# Patient Record
Sex: MALE | Race: ASIAN | NOT HISPANIC OR LATINO
[De-identification: names, ages, dates, MRNs, and addresses within clinical notes are randomized per-mention and may not be internally consistent; named-entity substitution may affect disease eponyms.]

---

## 2019-08-06 PROBLEM — Z00.00 ENCOUNTER FOR PREVENTIVE HEALTH EXAMINATION: Status: ACTIVE | Noted: 2019-08-06

## 2019-08-19 PROBLEM — Z72.89 ALCOHOL USE: Status: ACTIVE | Noted: 2019-08-19

## 2019-08-19 PROBLEM — K62.5 RECTAL BLEEDING: Status: ACTIVE | Noted: 2019-08-19

## 2019-08-19 PROBLEM — Z86.39 HISTORY OF HYPERCHOLESTEROLEMIA: Status: RESOLVED | Noted: 2019-08-19 | Resolved: 2019-08-19

## 2019-08-19 RX ORDER — ROSUVASTATIN CALCIUM 5 MG/1
TABLET, FILM COATED ORAL
Refills: 0 | Status: ACTIVE | COMMUNITY

## 2019-08-26 ENCOUNTER — APPOINTMENT (OUTPATIENT)
Dept: COLORECTAL SURGERY | Facility: CLINIC | Age: 64
End: 2019-08-26
Payer: COMMERCIAL

## 2019-08-26 DIAGNOSIS — K64.9 UNSPECIFIED HEMORRHOIDS: ICD-10-CM

## 2019-08-26 DIAGNOSIS — K62.5 HEMORRHAGE OF ANUS AND RECTUM: ICD-10-CM

## 2019-08-26 DIAGNOSIS — Z86.39 PERSONAL HISTORY OF OTHER ENDOCRINE, NUTRITIONAL AND METABOLIC DISEASE: ICD-10-CM

## 2019-08-26 DIAGNOSIS — Z72.89 OTHER PROBLEMS RELATED TO LIFESTYLE: ICD-10-CM

## 2019-08-26 PROCEDURE — 45378 DIAGNOSTIC COLONOSCOPY: CPT

## 2019-08-26 PROCEDURE — 99201 OFFICE OUTPATIENT NEW 10 MINUTES: CPT | Mod: 25

## 2019-08-26 RX ORDER — SODIUM PICOSULFATE, MAGNESIUM OXIDE, AND ANHYDROUS CITRIC ACID 10; 3.5; 12 MG/160ML; G/160ML; G/160ML
10-3.5-12 MG-GM LIQUID ORAL
Qty: 2 | Refills: 0 | Status: DISCONTINUED | COMMUNITY
Start: 2019-08-19 | End: 2019-08-26

## 2020-02-06 ENCOUNTER — APPOINTMENT (OUTPATIENT)
Dept: OPHTHALMOLOGY | Facility: CLINIC | Age: 65
End: 2020-02-06
Payer: COMMERCIAL

## 2020-02-06 ENCOUNTER — NON-APPOINTMENT (OUTPATIENT)
Age: 65
End: 2020-02-06

## 2020-02-06 PROCEDURE — 92201 OPSCPY EXTND RTA DRAW UNI/BI: CPT

## 2020-02-06 PROCEDURE — 92134 CPTRZ OPH DX IMG PST SGM RTA: CPT

## 2020-02-06 PROCEDURE — 92004 COMPRE OPH EXAM NEW PT 1/>: CPT

## 2020-02-20 ENCOUNTER — NON-APPOINTMENT (OUTPATIENT)
Age: 65
End: 2020-02-20

## 2020-02-20 ENCOUNTER — APPOINTMENT (OUTPATIENT)
Dept: OPHTHALMOLOGY | Facility: CLINIC | Age: 65
End: 2020-02-20
Payer: COMMERCIAL

## 2020-02-20 PROCEDURE — 92201 OPSCPY EXTND RTA DRAW UNI/BI: CPT

## 2020-02-20 PROCEDURE — 92012 INTRM OPH EXAM EST PATIENT: CPT

## 2020-03-19 ENCOUNTER — APPOINTMENT (OUTPATIENT)
Dept: OPHTHALMOLOGY | Facility: CLINIC | Age: 65
End: 2020-03-19

## 2021-09-29 ENCOUNTER — APPOINTMENT (OUTPATIENT)
Dept: ULTRASOUND IMAGING | Facility: CLINIC | Age: 66
End: 2021-09-29
Payer: COMMERCIAL

## 2021-09-29 ENCOUNTER — OUTPATIENT (OUTPATIENT)
Dept: OUTPATIENT SERVICES | Facility: HOSPITAL | Age: 66
LOS: 1 days | End: 2021-09-29
Payer: COMMERCIAL

## 2021-09-29 DIAGNOSIS — Z00.8 ENCOUNTER FOR OTHER GENERAL EXAMINATION: ICD-10-CM

## 2021-09-29 PROCEDURE — 76775 US EXAM ABDO BACK WALL LIM: CPT

## 2021-09-29 PROCEDURE — 76775 US EXAM ABDO BACK WALL LIM: CPT | Mod: 26

## 2022-01-31 ENCOUNTER — APPOINTMENT (OUTPATIENT)
Dept: RADIOLOGY | Facility: CLINIC | Age: 67
End: 2022-01-31
Payer: COMMERCIAL

## 2022-01-31 ENCOUNTER — OUTPATIENT (OUTPATIENT)
Dept: OUTPATIENT SERVICES | Facility: HOSPITAL | Age: 67
LOS: 1 days | End: 2022-01-31
Payer: COMMERCIAL

## 2022-01-31 DIAGNOSIS — Z00.00 ENCOUNTER FOR GENERAL ADULT MEDICAL EXAMINATION WITHOUT ABNORMAL FINDINGS: ICD-10-CM

## 2022-01-31 PROCEDURE — 73080 X-RAY EXAM OF ELBOW: CPT

## 2022-01-31 PROCEDURE — 73080 X-RAY EXAM OF ELBOW: CPT | Mod: 26,LT

## 2024-02-19 ENCOUNTER — APPOINTMENT (OUTPATIENT)
Dept: MRI IMAGING | Facility: CLINIC | Age: 69
End: 2024-02-19

## 2024-02-19 ENCOUNTER — OUTPATIENT (OUTPATIENT)
Dept: OUTPATIENT SERVICES | Facility: HOSPITAL | Age: 69
LOS: 1 days | End: 2024-02-19
Payer: COMMERCIAL

## 2024-02-19 ENCOUNTER — APPOINTMENT (OUTPATIENT)
Dept: RADIOLOGY | Facility: CLINIC | Age: 69
End: 2024-02-19
Payer: COMMERCIAL

## 2024-02-19 ENCOUNTER — APPOINTMENT (OUTPATIENT)
Dept: ULTRASOUND IMAGING | Facility: CLINIC | Age: 69
End: 2024-02-19
Payer: COMMERCIAL

## 2024-02-19 DIAGNOSIS — Z00.8 ENCOUNTER FOR OTHER GENERAL EXAMINATION: ICD-10-CM

## 2024-02-19 PROCEDURE — 72141 MRI NECK SPINE W/O DYE: CPT

## 2024-02-19 PROCEDURE — 73000 X-RAY EXAM OF COLLAR BONE: CPT

## 2024-02-19 PROCEDURE — 76536 US EXAM OF HEAD AND NECK: CPT | Mod: 26

## 2024-02-19 PROCEDURE — 72040 X-RAY EXAM NECK SPINE 2-3 VW: CPT | Mod: 26

## 2024-02-19 PROCEDURE — 76536 US EXAM OF HEAD AND NECK: CPT

## 2024-02-19 PROCEDURE — 73000 X-RAY EXAM OF COLLAR BONE: CPT | Mod: 26,LT

## 2024-02-19 PROCEDURE — 72141 MRI NECK SPINE W/O DYE: CPT | Mod: 26

## 2024-02-19 PROCEDURE — 72040 X-RAY EXAM NECK SPINE 2-3 VW: CPT

## 2024-12-25 PROBLEM — F10.90 ALCOHOL USE: Status: ACTIVE | Noted: 2019-08-19

## 2025-01-20 ENCOUNTER — OUTPATIENT (OUTPATIENT)
Dept: OUTPATIENT SERVICES | Facility: HOSPITAL | Age: 70
LOS: 1 days | End: 2025-01-20
Payer: COMMERCIAL

## 2025-01-20 ENCOUNTER — APPOINTMENT (OUTPATIENT)
Dept: CT IMAGING | Facility: CLINIC | Age: 70
End: 2025-01-20
Payer: COMMERCIAL

## 2025-01-20 DIAGNOSIS — Z00.8 ENCOUNTER FOR OTHER GENERAL EXAMINATION: ICD-10-CM

## 2025-01-20 PROCEDURE — 75571 CT HRT W/O DYE W/CA TEST: CPT | Mod: 26

## 2025-01-20 PROCEDURE — 75571 CT HRT W/O DYE W/CA TEST: CPT

## 2025-01-29 ENCOUNTER — TRANSCRIPTION ENCOUNTER (OUTPATIENT)
Age: 70
End: 2025-01-29

## 2025-01-29 ENCOUNTER — INPATIENT (INPATIENT)
Facility: HOSPITAL | Age: 70
LOS: 0 days | Discharge: HOME | DRG: 316 | End: 2025-01-30
Attending: INTERNAL MEDICINE | Admitting: INTERNAL MEDICINE
Payer: COMMERCIAL

## 2025-01-29 VITALS
HEART RATE: 55 BPM | DIASTOLIC BLOOD PRESSURE: 90 MMHG | TEMPERATURE: 98 F | SYSTOLIC BLOOD PRESSURE: 154 MMHG | OXYGEN SATURATION: 99 % | WEIGHT: 197.98 LBS | RESPIRATION RATE: 15 BRPM | HEIGHT: 70 IN

## 2025-01-29 DIAGNOSIS — R94.39 ABNORMAL RESULT OF OTHER CARDIOVASCULAR FUNCTION STUDY: ICD-10-CM

## 2025-01-29 DIAGNOSIS — Z90.49 ACQUIRED ABSENCE OF OTHER SPECIFIED PARTS OF DIGESTIVE TRACT: Chronic | ICD-10-CM

## 2025-01-29 LAB
ANION GAP SERPL CALC-SCNC: 12 MMOL/L — SIGNIFICANT CHANGE UP (ref 5–17)
BASOPHILS # BLD AUTO: 0.05 K/UL — SIGNIFICANT CHANGE UP (ref 0–0.2)
BASOPHILS NFR BLD AUTO: 0.6 % — SIGNIFICANT CHANGE UP (ref 0–2)
BLD GP AB SCN SERPL QL: SIGNIFICANT CHANGE UP
BUN SERPL-MCNC: 21.7 MG/DL — HIGH (ref 8–20)
CALCIUM SERPL-MCNC: 9.4 MG/DL — SIGNIFICANT CHANGE UP (ref 8.4–10.5)
CHLORIDE SERPL-SCNC: 104 MMOL/L — SIGNIFICANT CHANGE UP (ref 96–108)
CO2 SERPL-SCNC: 24 MMOL/L — SIGNIFICANT CHANGE UP (ref 22–29)
CREAT SERPL-MCNC: 1.33 MG/DL — HIGH (ref 0.5–1.3)
EGFR: 58 ML/MIN/1.73M2 — LOW
EOSINOPHIL # BLD AUTO: 0.18 K/UL — SIGNIFICANT CHANGE UP (ref 0–0.5)
EOSINOPHIL NFR BLD AUTO: 2.3 % — SIGNIFICANT CHANGE UP (ref 0–6)
GLUCOSE SERPL-MCNC: 96 MG/DL — SIGNIFICANT CHANGE UP (ref 70–99)
HCT VFR BLD CALC: 45 % — SIGNIFICANT CHANGE UP (ref 39–50)
HGB BLD-MCNC: 14.9 G/DL — SIGNIFICANT CHANGE UP (ref 13–17)
IMM GRANULOCYTES NFR BLD AUTO: 0.4 % — SIGNIFICANT CHANGE UP (ref 0–0.9)
LYMPHOCYTES # BLD AUTO: 2.38 K/UL — SIGNIFICANT CHANGE UP (ref 1–3.3)
LYMPHOCYTES # BLD AUTO: 30.1 % — SIGNIFICANT CHANGE UP (ref 13–44)
MAGNESIUM SERPL-MCNC: 2 MG/DL — SIGNIFICANT CHANGE UP (ref 1.6–2.6)
MCHC RBC-ENTMCNC: 30 PG — SIGNIFICANT CHANGE UP (ref 27–34)
MCHC RBC-ENTMCNC: 33.1 G/DL — SIGNIFICANT CHANGE UP (ref 32–36)
MCV RBC AUTO: 90.5 FL — SIGNIFICANT CHANGE UP (ref 80–100)
MONOCYTES # BLD AUTO: 0.56 K/UL — SIGNIFICANT CHANGE UP (ref 0–0.9)
MONOCYTES NFR BLD AUTO: 7.1 % — SIGNIFICANT CHANGE UP (ref 2–14)
NEUTROPHILS # BLD AUTO: 4.72 K/UL — SIGNIFICANT CHANGE UP (ref 1.8–7.4)
NEUTROPHILS NFR BLD AUTO: 59.5 % — SIGNIFICANT CHANGE UP (ref 43–77)
PLATELET # BLD AUTO: 201 K/UL — SIGNIFICANT CHANGE UP (ref 150–400)
POTASSIUM SERPL-MCNC: 4.1 MMOL/L — SIGNIFICANT CHANGE UP (ref 3.5–5.3)
POTASSIUM SERPL-SCNC: 4.1 MMOL/L — SIGNIFICANT CHANGE UP (ref 3.5–5.3)
RBC # BLD: 4.97 M/UL — SIGNIFICANT CHANGE UP (ref 4.2–5.8)
RBC # FLD: 12.8 % — SIGNIFICANT CHANGE UP (ref 10.3–14.5)
SODIUM SERPL-SCNC: 140 MMOL/L — SIGNIFICANT CHANGE UP (ref 135–145)
WBC # BLD: 7.92 K/UL — SIGNIFICANT CHANGE UP (ref 3.8–10.5)
WBC # FLD AUTO: 7.92 K/UL — SIGNIFICANT CHANGE UP (ref 3.8–10.5)

## 2025-01-29 RX ORDER — ROSUVASTATIN CALCIUM 10 MG/1
10 TABLET, FILM COATED ORAL AT BEDTIME
Refills: 0 | Status: DISCONTINUED | OUTPATIENT
Start: 2025-01-29 | End: 2025-01-29

## 2025-01-29 RX ORDER — ASPIRIN 81 MG/1
81 TABLET, COATED ORAL DAILY
Refills: 0 | Status: DISCONTINUED | OUTPATIENT
Start: 2025-01-29 | End: 2025-01-30

## 2025-01-29 RX ORDER — ASPIRIN 81 MG/1
81 TABLET, COATED ORAL ONCE
Refills: 0 | Status: COMPLETED | OUTPATIENT
Start: 2025-01-29 | End: 2025-01-29

## 2025-01-29 RX ORDER — ANTISEPTIC SURGICAL SCRUB 0.04 MG/ML
1 SOLUTION TOPICAL ONCE
Refills: 0 | Status: DISCONTINUED | OUTPATIENT
Start: 2025-01-29 | End: 2025-01-30

## 2025-01-29 RX ORDER — ASPIRIN 81 MG/1
81 TABLET, COATED ORAL
Refills: 0 | DISCHARGE

## 2025-01-29 RX ORDER — BACTERIOSTATIC SODIUM CHLORIDE 0.9 %
250 VIAL (ML) INJECTION ONCE
Refills: 0 | Status: COMPLETED | OUTPATIENT
Start: 2025-01-29 | End: 2025-01-29

## 2025-01-29 RX ORDER — NEBIVOLOL 10 MG/1
10 TABLET ORAL DAILY
Refills: 0 | Status: DISCONTINUED | OUTPATIENT
Start: 2025-01-29 | End: 2025-01-30

## 2025-01-29 RX ORDER — ACETAMINOPHEN 160 MG/5ML
650 SUSPENSION ORAL EVERY 6 HOURS
Refills: 0 | Status: DISCONTINUED | OUTPATIENT
Start: 2025-01-29 | End: 2025-01-30

## 2025-01-29 RX ORDER — ROSUVASTATIN CALCIUM 10 MG/1
20 TABLET, FILM COATED ORAL AT BEDTIME
Refills: 0 | Status: DISCONTINUED | OUTPATIENT
Start: 2025-01-29 | End: 2025-01-30

## 2025-01-29 RX ORDER — NEBIVOLOL 10 MG/1
1 TABLET ORAL
Refills: 0 | DISCHARGE

## 2025-01-29 RX ORDER — TICAGRELOR 90 MG/1
90 TABLET ORAL EVERY 12 HOURS
Refills: 0 | Status: DISCONTINUED | OUTPATIENT
Start: 2025-01-30 | End: 2025-01-30

## 2025-01-29 RX ADMIN — ROSUVASTATIN CALCIUM 20 MILLIGRAM(S): 10 TABLET, FILM COATED ORAL at 21:46

## 2025-01-29 RX ADMIN — Medication 250 MILLILITER(S): at 17:23

## 2025-01-29 RX ADMIN — Medication 250 MILLILITER(S): at 17:30

## 2025-01-29 RX ADMIN — ASPIRIN 81 MILLIGRAM(S): 81 TABLET, COATED ORAL at 17:23

## 2025-01-29 RX ADMIN — ASPIRIN 81 MILLIGRAM(S): 81 TABLET, COATED ORAL at 13:33

## 2025-01-29 NOTE — DISCHARGE NOTE PROVIDER - HOSPITAL COURSE
68 y/o male with PMHX significant for HTN, HLD, Hyperkinetic heart, on Bystolic, mild CKD, had diagnostic cath 20 years ago with normal coronaries per patient,  no recent TTE,  Pshx of Cholecystectomy, who work as a internal medicine physician . Patient underwent CT heart calcium score on 01/10/25 which revealed total calcium score of 1493. calcium score of left main: 18, LAD: 402, LCX: 423, AND RCA: 650. CT heart  calcium score study also revealed aortic valve calcifications, Patient with no symptoms, He is active, , plays Tennis once/ week. Denies HA, dizziness, CP, SOB, palpitations syncope, orthopnea. patient now presents to [facility] for LHC with possible intervention in setting of Elevated calcium score.    Now s/p LHC via RRA with Dr. Dc, pt tolerated procedure well. Pt arrived to recovery in NAD and HDS, RRA access site stable with radial band in place, no bleed/hematoma, distal pulse +2, RUE/ Right hand remains acyanotic; warm to touch; motor/sensory function intact; 2+ right radial pulse  Intraprocedural findings: mLAD 30% stenosis; mLCx 30% stenosis; mid Lcx 85% stenosis extending into Om2 s/p 1 ZAKI (BHARATH FRONTIER 2.5x22MM) pRCA 85% stenosis s/p 1 ZAKI (BHARATH FRONTIER 4.0x8MM); mRCA 80% stenosis s/p 1 ZAKI (BHARATH FRONTIER 3.5x38MM); dRCA 80% stenosis s/p 1 ZAKI (BHARATH FRONTIER 3.0x15MM); RPDA 90% stenosis s/p 1 ZAKI (BHARATH FRONTIER 2.5x38MM)   Medications:  Lidocaine 1%: 5ml  P2Y12 inhibitor: Brilinta 180mg PO x1 intra procedure  Fentanyl: 50mcg IV  Versed:1mg IV  Verapamil: 5mg IA  Heparin: 10,000 units IV  Nitroglycerin: 500mcg IC  NS bolus: 150ml IV  Omnipaque:117 ml   Closure Device: RRA with radial band in place without active bleeding or hematoma  Post Cath EKG: SR with no acute ischemic changes; q wave present in lead III present on prior ECG    Post procedure, patient denies chest pain, chest pressure, shortness of breath, palpitations, dizziness, indigestion or arm pain.     Plan:  -Labs and EKG in am  -Repeat ECG if any clinical indication or change on tele  -Continue current medical therapy  -Dual anti platelet therapy with aspirin 81mg daily/ brilinta 90mg BID  -Cont Bystolic 10mg daily  -increase statin therapy with rosuvastatin 20mg PO QHS., Check lipid panel in AM  -Educated regarding strict adherence with DAPT   -Educated regarding post procedure management and care  -Discussed the importance of RF modification  -Cardiac rehab info provided/referral and communication to cardiac rehab completed  -F/U outpt in 1-2 weeks with Cardiologist Dr. Dc  -DISPO:  Plan for D/C in am if remains HDS, ECG and labs in am stable and without complications    PLEASE DO NOT ADMINISTER BLOOD TRANSFUSION ON THIS PATIENT WITHIN 72 HOURS OF CARDIAC CATHERIZATION (UNLESS PATIENT IS HEMODYNAMICALLY UNSTABLE OR ACTIVELY BLEEDING) WITHOUT FIRST DISCUSSING WITH INTERVENTIONALIST DR. Dc ON TEAMS OR CALLING CATH LAB HOLDING -[phone].    70 y/o male with PMHX significant for HTN, HLD, Hyperkinetic heart, on Bystolic, mild CKD, had diagnostic cath 20 years ago with normal coronaries per patient,  no recent TTE,  Pshx of Cholecystectomy, who work as a internal medicine physician . Patient underwent CT heart calcium score on 01/10/25 which revealed total calcium score of 1493. calcium score of left main: 18, LAD: 402, LCX: 423, AND RCA: 650. CT heart  calcium score study also revealed aortic valve calcifications, Patient with no symptoms, He is active, , plays Tennis once/ week. Denies HA, dizziness, CP, SOB, palpitations syncope, orthopnea. patient now presents to [facility] for LHC with possible intervention in setting of Elevated calcium score.    Now s/p LHC via RRA with Dr. Dc, pt tolerated procedure well. Pt arrived to recovery in NAD and HDS, RRA access site stable with radial band in place, no bleed/hematoma, distal pulse +2, RUE/ Right hand remains acyanotic; warm to touch; motor/sensory function intact; 2+ right radial pulse  Intraprocedural findings: mLAD 30% stenosis; mLCx 30% stenosis; mid Lcx 85% stenosis extending into Om2 s/p 1 ZAKI (BHARATH FRONTIER 2.5x22MM) pRCA 85% stenosis s/p 1 ZAKI (BHARATH FRONTIER 4.0x8MM); mRCA 80% stenosis s/p 1 ZAKI (BHARATH FRONTIER 3.5x38MM); dRCA 80% stenosis s/p 1 ZAKI (BHARATH FRONTIER 3.0x15MM); RPDA 90% stenosis s/p 1 ZAKI (BHARATH FRONTIER 2.5x38MM)     Medications:  Lidocaine 1%: 5ml  P2Y12 inhibitor: Brilinta 180mg PO x1 intra procedure  Fentanyl: 50mcg IV  Versed:1mg IV  Verapamil: 5mg IA  Heparin: 10,000 units IV  Nitroglycerin: 500mcg IC  NS bolus: 150ml IV  Omnipaque:117 ml   Closure Device: RRA with radial band in place without active bleeding or hematoma  Post Cath EKG: SR with no acute ischemic changes; q wave present in lead III present on prior ECG  Post cath EKG on 01/30/25: no acute ST/T changes     Post procedure, patient denies chest pain, chest pressure, shortness of breath, palpitations, dizziness, indigestion or arm pain.     Plan:  -Post cath management per protocol  -SHERRY street explained to the patient  -AM labs and EKG benign  -Continue current medical therapy including:  -Dual anti platelet therapy with aspirin 81mg daily/ brilinta 90mg BID  -Cont Bystolic 10mg daily  -increase statin therapy with rosuvastatin 20mg PO QHS., Check lipid panel in AM  -Educated regarding strict adherence with DAPT   -Educated regarding post procedure management and care  -Discussed the importance of RF modification  -Cardiac rehab info provided/referral and communication to cardiac rehab completed  -F/U outpt in 1-2 weeks with Cardiologist Dr. Dc  -DISPO: Patient stable  for discharge to home, verbal and written instructions provided to the patient and he verbalized understanding        70 y/o male with PMHX significant for HTN, HLD, Hyperkinetic heart, on Bystolic, mild CKD, had diagnostic cath 20 years ago with normal coronaries per patient,  no recent TTE,  Pshx of Cholecystectomy, who work as a internal medicine physician . Patient underwent CT heart calcium score on 01/10/25 which revealed total calcium score of 1493. calcium score of left main: 18, LAD: 402, LCX: 423, AND RCA: 650. CT heart  calcium score study also revealed aortic valve calcifications, Patient with no symptoms, He is active, , plays Tennis once/ week. Denies HA, dizziness, CP, SOB, palpitations syncope, orthopnea. patient now presents to [facility] for LHC with possible intervention in setting of Elevated calcium score.    Now s/p LHC via RRA with Dr. Dc, pt tolerated procedure well. Pt arrived to recovery in NAD and HDS, RRA access site stable with radial band in place, no bleed/hematoma, distal pulse +2, RUE/ Right hand remains acyanotic; warm to touch; motor/sensory function intact; 2+ right radial pulse  Intraprocedural findings: mLAD 30% stenosis; mLCx 30% stenosis; mid Lcx 85% stenosis extending into Om2 s/p 1 ZAKI (BHARATH FRONTIER 2.5x22MM) pRCA 85% stenosis s/p 1 ZAKI (BHARATH FRONTIER 4.0x8MM); mRCA 80% stenosis s/p 1 ZAKI (BHARATH FRONTIER 3.5x38MM); dRCA 80% stenosis s/p 1 ZAKI (BHARATH FRONTIER 3.0x15MM); RPDA 90% stenosis s/p 1 ZAKI (BHARATH FRONTIER 2.5x38MM)     Medications:  Lidocaine 1%: 5ml  P2Y12 inhibitor: Brilinta 180mg PO x1 intra procedure  Fentanyl: 50mcg IV  Versed:1mg IV  Verapamil: 5mg IA  Heparin: 10,000 units IV  Nitroglycerin: 500mcg IC  NS bolus: 150ml IV  Omnipaque:117 ml   Closure Device: RRA with radial band in place without active bleeding or hematoma  Post Cath EKG: SR with no acute ischemic changes; q wave present in lead III present on prior ECG  Post cath EKG on 01/30/25: no acute ST/T changes     Post procedure, patient denies chest pain, chest pressure, shortness of breath, palpitations, dizziness, indigestion or arm pain.     < from: Cardiac Catheterization (01.29.25 @ 15:02) >    Conclusions:   Normla LVEF     Normal Left Main   30% stenosis of the mid LAD   30% stenosis of mid LCX    85% stenosis of mid LCX extending into OM2 successfully stented   Diffuse disease of the proximal, mid and distal RCA extending into  RPDA; Successfully stented  Recommendations:   Aspirin and Brilinta; Aggressive risk factor modification with diet,  exercise, and a goal LDL of less than 70.    Acute complication:    No complications, No complications     < end of copied text >    Physical Exam:    Physical Exam:  · Constitutional	well-groomed; no distress  · Eyes	PERRL; EOMI; conjunctiva clear  · ENMT	no gross abnormalities  · Respiratory	clear to auscultation bilaterally; no wheezes; no rales; no rhonchi  · Cardiovascular	regular rate and rhythm; S1 S2 present; no gallops; no rub; no murmur  · Neurological	Non focal      Plan  -Post cath management per protocol  -SHERRY precautiosn explained to the patient  -AM labs and EKG benign  -Continue current medical therapy including:  -Dual anti platelet therapy with aspirin 81mg daily/ brilinta 90mg BID  -Cont Bystolic 10mg daily  -increase statin therapy with rosuvastatin 20mg PO QHS., Check lipid panel in AM  -Educated regarding strict adherence with DAPT   -Educated regarding post procedure management and care  -Discussed the importance of RF modification  -Cardiac rehab info provided/referral and communication to cardiac rehab completed  -F/U outpt in 1-2 weeks with Cardiologist Dr. Dc  -DISPO: Patient stable  for discharge to home, verbal and written instructions provided to the patient and he verbalized understanding

## 2025-01-29 NOTE — DISCHARGE NOTE PROVIDER - CARE PROVIDER_API CALL
Adrián Dc  Interventional Cardiology  [address] [place] [address], [address]  [place] [address]  Phone: [phone]  Fax: [phone]  Established Patient  Follow Up Time: 1 week

## 2025-01-29 NOTE — DISCHARGE NOTE PROVIDER - NSDCCPCAREPLAN_GEN_ALL_CORE_FT
PRINCIPAL DISCHARGE DIAGNOSIS  Diagnosis: CAD (coronary artery disease)  Assessment and Plan of Treatment: Coronary artery disease is the buildup of plaque in the arteries that supply oxygen-rich blood to your heart. Plaque causes a narrowing or blockage that could result in a heart attack. Symptoms include chest pain or discomfort, shortness of breath, dizziness, palpitations, fatigue or reduced exercise tolerance. .  Go to the ED with any acute onset of chest pain, palpitations, shortness of breath or dizziness. Do NOT miss a dose or stop taking your Aspirin 81mg daily, Brilinta (Ticagrelor) 90mg twice a day. These medications keep your stent open and prevent a heart attack. If anyone tells you to stop these medications, speak to your cardiologist immediately.  Managing risk factors will help keep your stent open and prevent future blockages, risk factors may include: high blood pressure, high cholesterol, diabetes, obesity, sedentary life style and smoking.    Your diet should be low in fat, cholesterol, salt and carbohydrates, increase fruits (caution if diabetic), vegetables and whole grains/fiber rich foods.   Take all your cardiac  medications as prescribed.    Exercise is a very important factor in heart health. Once your post procedure restrictions have passed, you should engage in heart healthy, aerobic exercise. Be sure to have clearance from your cardiologist. Cardiac rehab programs could be extremely beneficial and your cardiologist could help set this up.   Follow up with your cardiologist within 1-2 weeks after your procedure.   Call your cardiologist or our unit ([phone]) with any questions or concerns that may arise.

## 2025-01-29 NOTE — H&P PST ADULT - NSICDXPASTMEDICALHX_GEN_ALL_CORE_FT
PAST MEDICAL HISTORY:  CKD (chronic kidney disease)     HLD (hyperlipidemia)     HTN (hypertension)     Hyperkinetic heart syndrome

## 2025-01-29 NOTE — H&P PST ADULT - HISTORY OF PRESENT ILLNESS
HPI:     This is a 68 y/o male with PMHX significant for HTN, HLD, Hyperkinetic heart, on Bystolic, mild CKD, had diagnostic cath 20 years ago with normal coronaries per patient,  no recent TTE,  Pshx of Cholecystectomy, who work as a internal medicine physician .  Patient underwent CT heart calcium score on 01/10/25 which revealed total calcium score of 1493.  calcium score of left main: 18, LAD: 402, LCX: 423, AND RCA: 650  CT heart  calcium score study also revealed aortic valve calcifications,   Patient with no symptoms, He is active, , plays Tennis once/ week  Denies HA, dizziness, CP, SOB, palpitations syncope, orthopnea  patient now presents to [facility] for LHC with possible intervention in setting of Elevated calcium score.        Symptoms:        Angina (Class): none       Ischemic Symptoms: none    Heart Failure: no       Systolic/Diastolic/Combined:        NYHA Class (within 2 weeks):     Assessment of LVEF (Must be within 6 months):       EF: no recent TTE , Normal TTE few years ago       Assessed by:        Date:     Prior Cardiac Interventions:       PCI's (Date, Stents, Vessels):        CABG (Date, Grafts):     Noninvasive Testing:   Stress Test: Date: none       Protocol:        Duration of Exercise:        Symptoms:        EKG Changes:        DTS:        Myocardial Imaging:        Risk Assessment (Low, Medium, High):     Echo (Date, Findings): few years ago per pt, report not avaiable    Antianginal Therapies:        Beta Blockers:  Bystolic       Calcium Channel Blockers: none       Long Acting Nitrates: none       Ranexa: none    Associated Risk Factors:        Cerebrovascular Disease: N/A       Chronic Lung Disease: N/A       Peripheral Arterial Disease: N/A       Chronic Kidney Disease (if yes, what is GFR): N/A       Uncontrolled Diabetes (if yes, what is HgbA1C or FBS): N/A       Poorly Controlled Hypertension (if yes, what is SBP): N/A       Morbid Obesity (if yes, what is BMI): N/A       History of Recent Ventricular Arrhythmia: N/A       Inability to Ambulate Safely: N/A       Need for Therapeutic Anticoagulation: N/A       Antiplatelet or Contrast Allergy: N/A      Vital Signs Last 24 Hrs  T(C): 36.7 (29 Jan 2025 12:49), Max: 36.7 (29 Jan 2025 12:49)  T(F): 98.1 (29 Jan 2025 12:49), Max: 98.1 (29 Jan 2025 12:49)  HR: 55 (29 Jan 2025 12:49) (55 - 55)  BP: 154/90 (29 Jan 2025 12:49) (154/90 - 154/90)  BP(mean): --  RR: 15 (29 Jan 2025 12:49) (15 - 15)  SpO2: 99% (29 Jan 2025 12:49) (99% - 99%)    Parameters below as of 29 Jan 2025 12:49  Patient On (Oxygen Delivery Method): room air    
Right arm;

## 2025-01-29 NOTE — DISCHARGE NOTE PROVIDER - NSDCCPTREATMENT_GEN_ALL_CORE_FT
PRINCIPAL PROCEDURE  Procedure: Coronary stent placement  Findings and Treatment: -You had a cardiac catheterization with Dr. Dc on 1/29/25. Dr Dc accessed your  right radial artery during the procedure. That is the artery in your right wrist.   -Intraprocedural findings: mLAD 30% stenosis; mLCx 30% stenosis; mid Lcx 85% stenosis extending into Om2 s/p 1 ZAKI (BHARATH FRONTIER 2.5x22MM); pRCA 85% stenosis s/p 1 ZAKI (BHARATH FRONTIER 4.0x8MM); mRCA 80% stenosis s/p 1 ZAKI (BHARATH FRONTIER 3.5x38MM); dRCA 80% stenosis s/p 1 ZAKI (BHARATH FRONTIER 3.0x15MM); RPDA 90% stenosis s/p 1 ZAKI (BHARATH FRONTIER 2.5x38MM)   -Please continue to monitor your right wrist when you go home. If you develop any bleeding, lay down where you are and apply direct firm pressure until the bleeding stops. If the bleeding is excessive, please call 911.   -If heavy bleeding or large lumps form, hold pressure at the spot and come to the Emergency Room.  -No submerging the arm in water for 48 hours. This includes hot tubs, swimming pools and jacuzzis.  You may start showering today. Prior to showering, remove dressing from right wrist. Shower with warm water and soap. Pat dry with towel. You may place a bandaid over the site. change the bandaid every day.   -Restricted use with no heavy lifting of affected arm for 48 hours. No heavy lifting greater than 5 lbs.   -You may walk indoors/ outdoors as tolerated. No strenuous exercise, gym, sports or heavy lifting x5 days.  -Please return to nearest ED if you develop any fever, chills, drainage from the incision site, or any change of temperature, color, sensation of the affected extremity.  -Call your doctor for any bleeding, swelling, loss of sensation in the hand or fingers, or fingers turning blue.  -Please return to nearest ED if you develop any chest pain, chest pressure, shortness of breath, jaw pain, pain radiating down the arm, palpitations, dizziness, palpitations, abdominal complaints including abdominal pain, nausea and vomiting.   -Please follow up with your cardiologist in 1-2 weeks

## 2025-01-29 NOTE — ASU PATIENT PROFILE, ADULT - FALL HARM RISK - ATTEMPT OOB
No care due was identified.  Health [facility] Embedded Care Due Messages. Reference number: [id number].   2/25/2024 10:31:35 AM CST   No

## 2025-01-29 NOTE — DISCHARGE NOTE PROVIDER - NSDCFUADDINST_GEN_ALL_CORE_FT
Restricted use with no heavy lifting of affected arm for 5 days.   No submerging the arm in water for 48 hours.  You may start showering today.  Call your doctor for any bleeding, swelling, loss of sensation in the hand or fingers, or fingers turning blue.  If heavy bleeding or large lumps form, hold pressure at the spot and come to the Emergency Room.

## 2025-01-29 NOTE — DISCHARGE NOTE PROVIDER - NSDCMRMEDTOKEN_GEN_ALL_CORE_FT
aspirin 81 mg oral delayed release tablet: 1 tab(s) orally once a day  Bystolic 10 mg oral tablet: 1 tab(s) orally once a day  Crestor 10 mg oral tablet: 1 tab(s) orally once a day   aspirin 81 mg oral delayed release tablet: 1 tab(s) orally once a day  Bystolic 10 mg oral tablet: 1 tab(s) orally once a day  rosuvastatin 20 mg oral tablet: 1 tab(s) orally once a day (at bedtime)  ticagrelor 90 mg oral tablet: 1 tab(s) orally every 12 hours

## 2025-01-29 NOTE — H&P PST ADULT - ASSESSMENT
This is a 68 y/o male with PMHX significant for HTN, HLD, Hyperkinetic heart, on Bystolic, mild CKD, had diagnostic cath 20 years ago with normal coronaries per patient,  no recent TTE,  Pshx of Cholecystectomy, who work as a internal medicine physician .  Patient underwent CT heart calcium score on 01/10/25 which revealed total calcium score of 1493.  calcium score of left main: 18, LAD: 402, LCX: 423, AND RCA: 650  CT heart  calcium score study also revealed aortic valve calcifications,   Patient with no symptoms, He is active, , plays Tennis once/ week  Patient endorses that during last cath20 yrs ago,  attempt via RRA failed and interventionalist did cath via RFA.   patient now presents to [facility] for LHC with possible intervention in setting of Elevated calcium score.      Risk Assessments:  ASA: 2  Mallampati: 3  GFR:   Cr:  BRA:    Pt assessed, appropriate for sedation, pt educated regarding the plan for Versed/Fentanyl as needed.        Plan:    -plan for  LHC via RFA  -patient seen and examined  -confirmed appropriate NPO duration  -ECG and Labs reviewed  -Aspirin 81mg po pre-cath  -NS 250ml iv bolus for pre cath hydration  -procedure discussed with patient; risks and benefits explained, inckluding, but not limited to, Bleeding, hematoma, MI, renal failure, stroke, limb inju=ry, death, and pt verbalized understanding  questions answered  -consent obtained by attending  DR Dc

## 2025-01-30 ENCOUNTER — TRANSCRIPTION ENCOUNTER (OUTPATIENT)
Age: 70
End: 2025-01-30

## 2025-01-30 VITALS
OXYGEN SATURATION: 99 % | TEMPERATURE: 98 F | HEART RATE: 64 BPM | RESPIRATION RATE: 18 BRPM | DIASTOLIC BLOOD PRESSURE: 72 MMHG | SYSTOLIC BLOOD PRESSURE: 117 MMHG

## 2025-01-30 LAB
ANION GAP SERPL CALC-SCNC: 13 MMOL/L — SIGNIFICANT CHANGE UP (ref 5–17)
BUN SERPL-MCNC: 20.8 MG/DL — HIGH (ref 8–20)
CALCIUM SERPL-MCNC: 9.2 MG/DL — SIGNIFICANT CHANGE UP (ref 8.4–10.5)
CHLORIDE SERPL-SCNC: 105 MMOL/L — SIGNIFICANT CHANGE UP (ref 96–108)
CHOLEST SERPL-MCNC: 134 MG/DL — SIGNIFICANT CHANGE UP
CO2 SERPL-SCNC: 23 MMOL/L — SIGNIFICANT CHANGE UP (ref 22–29)
CREAT SERPL-MCNC: 1.22 MG/DL — SIGNIFICANT CHANGE UP (ref 0.5–1.3)
EGFR: 65 ML/MIN/1.73M2 — SIGNIFICANT CHANGE UP
GLUCOSE SERPL-MCNC: 90 MG/DL — SIGNIFICANT CHANGE UP (ref 70–99)
HCT VFR BLD CALC: 43.5 % — SIGNIFICANT CHANGE UP (ref 39–50)
HDLC SERPL-MCNC: 44 MG/DL — SIGNIFICANT CHANGE UP
HGB BLD-MCNC: 14.3 G/DL — SIGNIFICANT CHANGE UP (ref 13–17)
LIPID PNL WITH DIRECT LDL SERPL: 67 MG/DL — SIGNIFICANT CHANGE UP
MAGNESIUM SERPL-MCNC: 2.1 MG/DL — SIGNIFICANT CHANGE UP (ref 1.6–2.6)
MCHC RBC-ENTMCNC: 30 PG — SIGNIFICANT CHANGE UP (ref 27–34)
MCHC RBC-ENTMCNC: 32.9 G/DL — SIGNIFICANT CHANGE UP (ref 32–36)
MCV RBC AUTO: 91.2 FL — SIGNIFICANT CHANGE UP (ref 80–100)
NON HDL CHOLESTEROL: 90 MG/DL — SIGNIFICANT CHANGE UP
PLATELET # BLD AUTO: 211 K/UL — SIGNIFICANT CHANGE UP (ref 150–400)
POTASSIUM SERPL-MCNC: 3.9 MMOL/L — SIGNIFICANT CHANGE UP (ref 3.5–5.3)
POTASSIUM SERPL-SCNC: 3.9 MMOL/L — SIGNIFICANT CHANGE UP (ref 3.5–5.3)
RBC # BLD: 4.77 M/UL — SIGNIFICANT CHANGE UP (ref 4.2–5.8)
RBC # FLD: 12.9 % — SIGNIFICANT CHANGE UP (ref 10.3–14.5)
SODIUM SERPL-SCNC: 141 MMOL/L — SIGNIFICANT CHANGE UP (ref 135–145)
TRIGL SERPL-MCNC: 127 MG/DL — SIGNIFICANT CHANGE UP
WBC # BLD: 9.71 K/UL — SIGNIFICANT CHANGE UP (ref 3.8–10.5)
WBC # FLD AUTO: 9.71 K/UL — SIGNIFICANT CHANGE UP (ref 3.8–10.5)

## 2025-01-30 PROCEDURE — 93010 ELECTROCARDIOGRAM REPORT: CPT

## 2025-01-30 PROCEDURE — 85027 COMPLETE CBC AUTOMATED: CPT

## 2025-01-30 PROCEDURE — 80061 LIPID PANEL: CPT

## 2025-01-30 PROCEDURE — C9601: CPT | Mod: RC

## 2025-01-30 PROCEDURE — C1894: CPT

## 2025-01-30 PROCEDURE — 85025 COMPLETE CBC W/AUTO DIFF WBC: CPT

## 2025-01-30 PROCEDURE — C1725: CPT

## 2025-01-30 PROCEDURE — 93005 ELECTROCARDIOGRAM TRACING: CPT

## 2025-01-30 PROCEDURE — 86901 BLOOD TYPING SEROLOGIC RH(D): CPT

## 2025-01-30 PROCEDURE — C1753: CPT

## 2025-01-30 PROCEDURE — 83735 ASSAY OF MAGNESIUM: CPT

## 2025-01-30 PROCEDURE — C1769: CPT

## 2025-01-30 PROCEDURE — 92978 ENDOLUMINL IVUS OCT C 1ST: CPT | Mod: RC

## 2025-01-30 PROCEDURE — 86900 BLOOD TYPING SEROLOGIC ABO: CPT

## 2025-01-30 PROCEDURE — C1874: CPT

## 2025-01-30 PROCEDURE — C1887: CPT

## 2025-01-30 PROCEDURE — 36415 COLL VENOUS BLD VENIPUNCTURE: CPT

## 2025-01-30 PROCEDURE — 80048 BASIC METABOLIC PNL TOTAL CA: CPT

## 2025-01-30 PROCEDURE — 86850 RBC ANTIBODY SCREEN: CPT

## 2025-01-30 PROCEDURE — 93458 L HRT ARTERY/VENTRICLE ANGIO: CPT | Mod: 59

## 2025-01-30 PROCEDURE — C9600: CPT | Mod: RC

## 2025-01-30 RX ORDER — ROSUVASTATIN CALCIUM 10 MG/1
1 TABLET, FILM COATED ORAL
Qty: 0 | Refills: 0 | DISCHARGE
Start: 2025-01-30

## 2025-01-30 RX ORDER — TICAGRELOR 90 MG/1
1 TABLET ORAL
Qty: 60 | Refills: 3
Start: 2025-01-30

## 2025-01-30 RX ORDER — ROSUVASTATIN CALCIUM 10 MG/1
1 TABLET, FILM COATED ORAL
Refills: 0 | DISCHARGE

## 2025-01-30 RX ORDER — ASPIRIN 81 MG/1
1 TABLET, COATED ORAL
Refills: 0 | DISCHARGE

## 2025-01-30 RX ORDER — ASPIRIN 81 MG/1
1 TABLET, COATED ORAL
Qty: 90 | Refills: 3
Start: 2025-01-30

## 2025-01-30 RX ADMIN — NEBIVOLOL 10 MILLIGRAM(S): 10 TABLET ORAL at 05:11

## 2025-01-30 RX ADMIN — TICAGRELOR 90 MILLIGRAM(S): 90 TABLET ORAL at 05:11

## 2025-01-30 NOTE — DISCHARGE NOTE NURSING/CASE MANAGEMENT/SOCIAL WORK - NSDCPEFALRISK_GEN_ALL_CORE
For information on Fall & Injury Prevention, visit: [email or web address] OR  [email or web address] OR  https://www.cdc.gov/steadi/patient.html

## 2025-01-30 NOTE — PROGRESS NOTE ADULT - ASSESSMENT
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 calcium score of left main: 18, LAD: 402, LCX: 423, AND RCA: 650. CT heart  calcium score study also revealed aortic valve calcifications, Patient with no symptoms, He is active, , plays Tennis once/ week. Denies HA, dizziness, CP, SOB, palpitations syncope, orthopnea. patient now presents to [facility] for LHC with possible intervention in setting of Elevated calcium score.    Now s/p LHC via RRA with Dr. Dc, pt tolerated procedure well. Pt arrived to recovery in NAD and HDS, RRA access site stable with radial band in place, no bleed/hematoma, distal pulse +2, RUE/ Right hand remains acyanotic; warm to touch; motor/sensory function intact; 2+ right radial pulse  Intraprocedural findings: mLAD 30% stenosis; mLCx 30% stenosis; mid Lcx 85% stenosis extending into Om2 s/p 1 ZAKI (BHARATH FRONTIER 2.5x22MM) pRCA 85% stenosis s/p 1 ZAKI (BHARATH FRONTIER 4.0x8MM); mRCA 80% stenosis s/p 1 ZAKI (BHARATH FRONTIER 3.5x38MM); dRCA 80% stenosis s/p 1 ZAKI (BHARATH FRONTIER 3.0x15MM); RPDA 90% stenosis s/p 1 ZAKI (BHARATH FRONTIER 2.5x38MM)     Medications:  Lidocaine 1%: 5ml  P2Y12 inhibitor: Brilinta 180mg PO x1 intra procedure  Fentanyl: 50mcg IV  Versed:1mg IV  Verapamil: 5mg IA  Heparin: 10,000 units IV  Nitroglycerin: 500mcg IC  NS bolus: 150ml IV  Omnipaque:117 ml   Closure Device: RRA with radial band in place without active bleeding or hematoma  Post Cath EKG: SR with no acute ischemic changes; q wave present in lead III present on prior ECG  Post cath EKG on 01/30/25: no acute ST/T changes     Post procedure, patient denies chest pain, chest pressure, shortness of breath, palpitations, dizziness, indigestion or arm pain.     < from: Cardiac Catheterization (01.29.25 @ 15:02) >    Conclusions:   Normla LVEF     Normal Left Main   30% stenosis of the mid LAD   30% stenosis of mid LCX    85% stenosis of mid LCX extending into OM2 successfully stented   Diffuse disease of the proximal, mid and distal RCA extending into  RPDA; Successfully stented  Recommendations:   Aspirin and Brilinta; Aggressive risk factor modification with diet,  exercise, and a goal LDL of less than 70.    Acute complication:    No complications, No complications       Plan  -Post cath management per protocol  -SHERRY precautiosn explained to the patient  -AM labs and EKG benign  -Continue current medical therapy including:  -Dual anti platelet therapy with aspirin 81mg daily/ brilinta 90mg BID  -Cont Bystolic 10mg daily  -increase statin therapy with rosuvastatin 20mg PO QHS., Check lipid panel in AM  -Educated regarding strict adherence with DAPT   -Educated regarding post procedure management and care  -Discussed the importance of RF modification  -Cardiac rehab info provided/referral and communication to cardiac rehab completed  -F/U outpt in 1-2 weeks with Cardiologist Dr. Dc  -DISPO: Patient stable  for discharge to home, verbal and written instructions provided to the patient and he verbalized understanding

## 2025-01-30 NOTE — PROGRESS NOTE ADULT - SUBJECTIVE AND OBJECTIVE BOX
Now s/p LHC via RRA with Dr. Dc, pt tolerated procedure well. Pt arrived to recovery in NAD and HDS, RRA access site stable with radial band in place, no bleed/hematoma, distal pulse +2, RUE/ Right hand remains acyanotic; warm to touch; motor/sensory function intact; 2+ right radial pulse  Intraprocedural findings: mLAD 30% stenosis; mLCx 30% stenosis; mid Lcx 85% stenosis extending into Om2 s/p 1 ZAKI (BHARATH FRONTIER 2.5x22MM) pRCA 85% stenosis s/p 1 ZAKI (BHARATH FRONTIER 4.0x8MM); mRCA 80% stenosis s/p 1 ZAKI (BHARATH FRONTIER 3.5x38MM); dRCA 80% stenosis s/p 1 ZAKI (BHARATH FRONTIER 3.0x15MM); RPDA 90% stenosis s/p 1 ZAKI (BHARATH FRONTIER 2.5x38MM)   Medications:  Lidocaine 1%: 5ml  P2Y12 inhibitor: Brilinta 180mg PO x1 intra procedure  Fentanyl: 50mcg IV  Versed:1mg IV  Verapamil: 5mg IA  Heparin: 10,000 units IV  Nitroglycerin: 500mcg IC  NS bolus: 150ml IV  Omnipaque:117 ml   Closure Device: RRA with radial band in place without active bleeding or hematoma  Post Cath EKG: SR with no acute ischemic changes; q wave present in lead III present on prior ECG    Post procedure, patient denies chest pain, chest pressure, shortness of breath, palpitations, dizziness, indigestion or arm pain.     Plan:  -Post procedure management/monitoring per protocol  -Access site precautions  -Radial compression band removal at 6pm if RRA site stable w/o active bleeding or hematoma  -Bedrest x 2 hours post procedure. Ok to ambulate in 2.5 hours at 7pm if RRA site stable w/o active bleeding or hematoma  -Labs and EKG in am  -NS 0.9% 250ml/hr x 1 bolus: post procedure RAMIRO ppx   -Repeat ECG if any clinical indication or change on tele  -Continue current medical therapy  -Dual anti platelet therapy with aspirin 81mg daily/ brilinta 90mg BID  -Cont Bystolic 10mg daily  -increase statin therapy with rosuvastatin 20mg PO QHS., Check lipid panel in AM  -Educated regarding strict adherence with DAPT   -Educated regarding post procedure management and care  -Discussed the importance of RF modification  -Cardiac rehab info provided/referral and communication to cardiac rehab completed  -F/U outpt in 1-2 weeks with Cardiologist Dr. Dc  -DISPO:  Plan for D/C in am if remains HDS, ECG and labs in am stable and without complications    PLEASE DO NOT ADMINISTER BLOOD TRANSFUSION ON THIS PATIENT WITHIN 72 HOURS OF CARDIAC CATHERIZATION (UNLESS PATIENT IS HEMODYNAMICALLY UNSTABLE OR ACTIVELY BLEEDING) WITHOUT FIRST DISCUSSING WITH INTERVENTIONALIST DR. Dc ON TEAMS OR CALLING CATH LAB HOLDING [phone].      
                                                                              Department of Cardiology                                                                  [facility]/[place] [address] [place] [address]                                                            Telephone: [phone]. Fax:[phone]                                                                                 Interventional Cardiology Progress note            Reason for follow up: S/P LHC WITH PCI WITH DR Dc  Overnight events: none   	  MEDICATIONS:  nebivolol 10 milliGRAM(s) Oral daily        acetaminophen     Tablet .. 650 milliGRAM(s) Oral every 6 hours PRN      rosuvastatin 20 milliGRAM(s) Oral at bedtime    aspirin  chewable 81 milliGRAM(s) Oral daily  chlorhexidine 4% Liquid 1 Application(s) Topical once  ticagrelor 90 milliGRAM(s) Oral every 12 hours        PHYSICAL EXAM:    T(C): 36.6 (01-30-25 @ 08:07), Max: 36.6 (01-29-25 @ 21:49)  HR: 64 (01-30-25 @ 08:07) (52 - 64)  BP: 117/72 (01-30-25 @ 08:07) (116/72 - 175/96)  RR: 18 (01-30-25 @ 08:07) (16 - 20)  SpO2: 99% (01-30-25 @ 08:07) (93% - 100%)  Wt(kg): --    I&O's Summary      Daily     Daily     Appearance: Normal	  HEENT:   Normal oral mucosa, PERRL, EOMI	  Lymphatic: No lymphadenopathy  Cardiovascular: Normal S1 S2, No JVD, No murmurs, No edema  Respiratory: Lungs clear to auscultation	  Psychiatry: A & O x 3, Mood & affect appropriate  Gastrointestinal:  Soft, Non-tender, + BS	  Skin: No rashes, No ecchymoses, No cyanosis  Neurologic: Non-focal  Extremities: Normal range of motion, No clubbing, cyanosis or edema  Vascular: Peripheral pulses palpable 2+ bilaterally    TELEMETRY: SR, no acute ST/T changes     ECG: SR, no acute ST/T changes         DIAGNOSTIC TESTING:    < from: Cardiac Catheterization (01.29.25 @ 15:02) >  Procedures Performed   Procedures:               1.    Arterial Access - Right Radial     2.    Diagnostic Coronary Angiography   3.    Left Heart Cath   4.    PCI: ZAKI   5.    IVUS   6.    PCI: Cutting Balloon Angioplasty   7.    PCI: ZAKI     Indications:                Cardiac: abnormal cardiovascular testing   PCI Status:                elective     Conclusions:   Normla LVEF     Normal Left Main   30% stenosis of the mid LAD   30% stenosis of mid LCX    85% stenosis of mid LCX extending into OM2 successfully stented   Diffuse disease of the proximal, mid and distal RCA extending into  RPDA; Successfully stented  Recommendations:   Aspirin and Brilinta; Aggressive risk factor modification with diet,  exercise, and a goal LDL of less than 70.    Acute complication:    No complications, No complications     < end of copied text >      LABS:	 	    CARDIAC MARKERS:                                  14.3   9.71  )-----------( 211      ( 30 Jan 2025 05:22 )             43.5     01-30    141  |  105  |  20.8[H]  ----------------------------<  90  3.9   |  23.0  |  1.22    Ca    9.2      30 Jan 2025 05:22  Mg     2.1     01-30

## 2025-01-30 NOTE — DISCHARGE NOTE NURSING/CASE MANAGEMENT/SOCIAL WORK - FINANCIAL ASSISTANCE
[facility] provides services at a reduced cost to those who are determined to be eligible through [facility]’s financial assistance program. Information regarding [facility]’s financial assistance program can be found by going to [email or web address] or by calling [phone].

## 2025-01-30 NOTE — DISCHARGE NOTE NURSING/CASE MANAGEMENT/SOCIAL WORK - PATIENT PORTAL LINK FT
You can access the FollowMyHealth Patient Portal offered by [facility] by registering at the following website: [email or web address]. By joining [facility]’s FollowMyHealth portal, you will also be able to view your health information using other applications (apps) compatible with our system.

## 2025-01-31 PROBLEM — I10 ESSENTIAL (PRIMARY) HYPERTENSION: Chronic | Status: ACTIVE | Noted: 2025-01-29

## 2025-01-31 PROBLEM — N18.9 CHRONIC KIDNEY DISEASE, UNSPECIFIED: Chronic | Status: ACTIVE | Noted: 2025-01-29

## 2025-01-31 PROBLEM — I51.89 OTHER ILL-DEFINED HEART DISEASES: Chronic | Status: ACTIVE | Noted: 2025-01-29

## 2025-01-31 PROBLEM — E78.5 HYPERLIPIDEMIA, UNSPECIFIED: Chronic | Status: ACTIVE | Noted: 2025-01-29

## 2025-02-05 ENCOUNTER — APPOINTMENT (OUTPATIENT)
Dept: ULTRASOUND IMAGING | Facility: CLINIC | Age: 70
End: 2025-02-05
Payer: COMMERCIAL

## 2025-02-05 ENCOUNTER — OUTPATIENT (OUTPATIENT)
Dept: OUTPATIENT SERVICES | Facility: HOSPITAL | Age: 70
LOS: 1 days | End: 2025-02-05
Payer: COMMERCIAL

## 2025-02-05 DIAGNOSIS — Z90.49 ACQUIRED ABSENCE OF OTHER SPECIFIED PARTS OF DIGESTIVE TRACT: Chronic | ICD-10-CM

## 2025-02-05 DIAGNOSIS — Z00.8 ENCOUNTER FOR OTHER GENERAL EXAMINATION: ICD-10-CM

## 2025-02-05 PROCEDURE — 93880 EXTRACRANIAL BILAT STUDY: CPT | Mod: 26

## 2025-02-05 PROCEDURE — 93880 EXTRACRANIAL BILAT STUDY: CPT

## 2025-02-12 ENCOUNTER — OUTPATIENT (OUTPATIENT)
Dept: OUTPATIENT SERVICES | Facility: HOSPITAL | Age: 70
LOS: 1 days | End: 2025-02-12
Payer: COMMERCIAL

## 2025-02-12 ENCOUNTER — APPOINTMENT (OUTPATIENT)
Dept: CT IMAGING | Facility: CLINIC | Age: 70
End: 2025-02-12
Payer: COMMERCIAL

## 2025-02-12 DIAGNOSIS — Z00.8 ENCOUNTER FOR OTHER GENERAL EXAMINATION: ICD-10-CM

## 2025-02-12 DIAGNOSIS — Z90.49 ACQUIRED ABSENCE OF OTHER SPECIFIED PARTS OF DIGESTIVE TRACT: Chronic | ICD-10-CM

## 2025-02-12 PROCEDURE — 70498 CT ANGIOGRAPHY NECK: CPT

## 2025-02-12 PROCEDURE — 70498 CT ANGIOGRAPHY NECK: CPT | Mod: 26

## 2025-02-26 ENCOUNTER — APPOINTMENT (OUTPATIENT)
Dept: CT IMAGING | Facility: CLINIC | Age: 70
End: 2025-02-26
Payer: COMMERCIAL

## 2025-02-26 ENCOUNTER — OUTPATIENT (OUTPATIENT)
Dept: OUTPATIENT SERVICES | Facility: HOSPITAL | Age: 70
LOS: 1 days | End: 2025-02-26
Payer: COMMERCIAL

## 2025-02-26 DIAGNOSIS — Z90.49 ACQUIRED ABSENCE OF OTHER SPECIFIED PARTS OF DIGESTIVE TRACT: Chronic | ICD-10-CM

## 2025-02-26 DIAGNOSIS — Z00.8 ENCOUNTER FOR OTHER GENERAL EXAMINATION: ICD-10-CM

## 2025-02-26 PROCEDURE — 70450 CT HEAD/BRAIN W/O DYE: CPT

## 2025-02-26 PROCEDURE — 70450 CT HEAD/BRAIN W/O DYE: CPT | Mod: 26
